# Patient Record
(demographics unavailable — no encounter records)

---

## 2025-02-12 NOTE — DISCUSSION/SUMMARY
[de-identified] : Pt. has a bimalleolar equivalent ankle fracture.  Discussed treatment options, both operative and non-operative, surgery is the recommended treatment.  Continue NWB in splint (placed in AO splint today), the importance of compliance stressed to patient. Rolling knee walker recommended, prescription provided.  Patient was instructed that they can not operate an automatic vehicle while wearing a CAM boot or cast on the right lower extremity. If operating a vehicle that requires use of a clutch, patient may not drive while wearing a CAM boot or cast on the left lower extremity.  The risks and benefits of surgery have been discussed. Risks include but are not limited to bleeding, infection, reaction to anesthesia, injury to blood vessels and nerves, malunion, nonunion, necessity of repeat surgery, chronic pain, DVT, PE, loss of limb and death. The patient understands the risks and agrees with the surgical plan. Details of the procedure and postoperative protocol were discussed at length. All questions have been answered.  Pt. would like to think about surgery.   Addendum 2/12/25: Recommend rolling knee scooter as she is unsteady on crutches and requires being non-weight bearing due to her left ankle fracture.   Progress Note entered by Ferny Lawrence PA-C working as a scribe for Dr. Olivera. Patient seen by Ferny Lawrence PA-C under the supervision of Dr. Olivera.

## 2025-02-12 NOTE — DATA REVIEWED
[Outside X-rays] : outside x-rays [Ankle] : ankle [I independently reviewed and interpreted images and report] : I independently reviewed and interpreted images and report [FreeTextEntry1] : [facility] with [facility]: Mildly displaced lateral malleolus fracture without medial joint space widening.

## 2025-02-12 NOTE — DATA REVIEWED
[Outside X-rays] : outside x-rays [Ankle] : ankle [I independently reviewed and interpreted images and report] : I independently reviewed and interpreted images and report [FreeTextEntry1] : [facility]: Mildly displaced lateral malleolus fracture without medial joint space widening.

## 2025-02-12 NOTE — DISCUSSION/SUMMARY
[de-identified] : Pt. has a bimalleolar equivalent ankle fracture.  Discussed treatment options, both operative and non-operative, surgery is the recommended treatment.  Continue NWB in splint (placed in AO splint today), the importance of compliance stressed to patient. Rolling knee walker recommended, prescription provided.  Patient was instructed that they can not operate an automatic vehicle while wearing a CAM boot or cast on the right lower extremity. If operating a vehicle that requires use of a clutch, patient may not drive while wearing a CAM boot or cast on the left lower extremity.  The risks and benefits of surgery have been discussed. Risks include but are not limited to bleeding, infection, reaction to anesthesia, injury to blood vessels and nerves, malunion, nonunion, necessity of repeat surgery, chronic pain, DVT, PE, loss of limb and death. The patient understands the risks and agrees with the surgical plan. Details of the procedure and postoperative protocol were discussed at length. All questions have been answered.  Pt. would like to think about surgery.   Addendum 2/12/25: Recommend rolling knee scooter as she is unsteady on crutches and requires being non-weight bearing due to her left ankle fracture.   Progress Note entered by Ferny Lawrence PA-C working as a scribe for Dr. Olivera. Patient seen by Ferny Lawrence PA-C under the supervision of Dr. Olivera.

## 2025-02-12 NOTE — PHYSICAL EXAM
[Moderate] : moderate swelling of lateral ankle [Mild] : mild swelling of medial ankle [2+] : posterior tibialis pulse: 2+ [Normal] : saphenous nerve sensation normal [Left] : left ankle [] : no pain when stressing lateral tarsal metatarsal joint [de-identified] : Not assessed.  [FreeTextEntry9] : Mildly displaced lateral malleolus fracture with clear medial joint space widening on stress view.

## 2025-02-12 NOTE — PHYSICAL EXAM
[Moderate] : moderate swelling of lateral ankle [Mild] : mild swelling of medial ankle [2+] : posterior tibialis pulse: 2+ [Normal] : saphenous nerve sensation normal [Left] : left ankle [] : no pain when stressing lateral tarsal metatarsal joint [de-identified] : Not assessed.  [FreeTextEntry9] : Mildly displaced lateral malleolus fracture with clear medial joint space widening on stress view.